# Patient Record
Sex: MALE | Race: WHITE | NOT HISPANIC OR LATINO | URBAN - METROPOLITAN AREA
[De-identification: names, ages, dates, MRNs, and addresses within clinical notes are randomized per-mention and may not be internally consistent; named-entity substitution may affect disease eponyms.]

---

## 2019-03-31 ENCOUNTER — EMERGENCY (EMERGENCY)
Facility: HOSPITAL | Age: 34
LOS: 1 days | Discharge: HOME | End: 2019-03-31
Attending: EMERGENCY MEDICINE | Admitting: EMERGENCY MEDICINE
Payer: SELF-PAY

## 2019-03-31 VITALS
RESPIRATION RATE: 18 BRPM | HEART RATE: 110 BPM | OXYGEN SATURATION: 96 % | DIASTOLIC BLOOD PRESSURE: 77 MMHG | SYSTOLIC BLOOD PRESSURE: 135 MMHG | TEMPERATURE: 98 F

## 2019-03-31 VITALS
SYSTOLIC BLOOD PRESSURE: 120 MMHG | DIASTOLIC BLOOD PRESSURE: 79 MMHG | TEMPERATURE: 98 F | RESPIRATION RATE: 24 BRPM | OXYGEN SATURATION: 100 % | HEART RATE: 119 BPM

## 2019-03-31 VITALS
DIASTOLIC BLOOD PRESSURE: 80 MMHG | OXYGEN SATURATION: 100 % | TEMPERATURE: 98 F | SYSTOLIC BLOOD PRESSURE: 123 MMHG | RESPIRATION RATE: 17 BRPM | HEART RATE: 98 BPM

## 2019-03-31 VITALS
OXYGEN SATURATION: 96 % | HEART RATE: 98 BPM | TEMPERATURE: 99 F | SYSTOLIC BLOOD PRESSURE: 149 MMHG | DIASTOLIC BLOOD PRESSURE: 81 MMHG | RESPIRATION RATE: 20 BRPM

## 2019-03-31 LAB
ANION GAP SERPL CALC-SCNC: 10 MMOL/L — SIGNIFICANT CHANGE UP (ref 9–16)
BUN SERPL-MCNC: 10 MG/DL — SIGNIFICANT CHANGE UP (ref 7–23)
CALCIUM SERPL-MCNC: 9.3 MG/DL — SIGNIFICANT CHANGE UP (ref 8.5–10.5)
CHLORIDE SERPL-SCNC: 103 MMOL/L — SIGNIFICANT CHANGE UP (ref 96–108)
CO2 SERPL-SCNC: 23 MMOL/L — SIGNIFICANT CHANGE UP (ref 22–31)
CREAT SERPL-MCNC: 0.85 MG/DL — SIGNIFICANT CHANGE UP (ref 0.5–1.3)
GLUCOSE SERPL-MCNC: 93 MG/DL — SIGNIFICANT CHANGE UP (ref 70–99)
HCT VFR BLD CALC: 39.3 % — SIGNIFICANT CHANGE UP (ref 39–50)
HGB BLD-MCNC: 12.1 G/DL — LOW (ref 13–17)
MCHC RBC-ENTMCNC: 19.1 PG — LOW (ref 27–34)
MCHC RBC-ENTMCNC: 30.8 G/DL — LOW (ref 32–36)
MCV RBC AUTO: 61.9 FL — LOW (ref 80–100)
PLATELET # BLD AUTO: 308 K/UL — SIGNIFICANT CHANGE UP (ref 150–400)
POTASSIUM SERPL-MCNC: 3.6 MMOL/L — SIGNIFICANT CHANGE UP (ref 3.5–5.3)
POTASSIUM SERPL-SCNC: 3.6 MMOL/L — SIGNIFICANT CHANGE UP (ref 3.5–5.3)
RBC # BLD: 6.35 M/UL — HIGH (ref 4.2–5.8)
RBC # FLD: 16.7 % — HIGH (ref 10.3–14.5)
SODIUM SERPL-SCNC: 136 MMOL/L — SIGNIFICANT CHANGE UP (ref 132–145)
WBC # BLD: 10.5 K/UL — SIGNIFICANT CHANGE UP (ref 3.8–10.5)
WBC # FLD AUTO: 10.5 K/UL — SIGNIFICANT CHANGE UP (ref 3.8–10.5)

## 2019-03-31 PROCEDURE — 93010 ELECTROCARDIOGRAM REPORT: CPT

## 2019-03-31 PROCEDURE — 99291 CRITICAL CARE FIRST HOUR: CPT

## 2019-03-31 PROCEDURE — 99285 EMERGENCY DEPT VISIT HI MDM: CPT | Mod: 25

## 2019-03-31 PROCEDURE — 99284 EMERGENCY DEPT VISIT MOD MDM: CPT

## 2019-03-31 RX ORDER — OXYCODONE AND ACETAMINOPHEN 5; 325 MG/1; MG/1
1 TABLET ORAL ONCE
Qty: 0 | Refills: 0 | Status: DISCONTINUED | OUTPATIENT
Start: 2019-03-31 | End: 2019-03-31

## 2019-03-31 RX ADMIN — OXYCODONE AND ACETAMINOPHEN 1 TABLET(S): 5; 325 TABLET ORAL at 09:50

## 2019-03-31 RX ADMIN — OXYCODONE AND ACETAMINOPHEN 1 TABLET(S): 5; 325 TABLET ORAL at 10:25

## 2019-03-31 NOTE — ED PROVIDER NOTE - OBJECTIVE STATEMENT
Healthy 32 y/o presents with persistent painful penile erection since 5:00 this morning after pt injected Trimix. Denies drug use, other pain, or discharge. Second time using Trimix and 1st time with this reaction.

## 2019-03-31 NOTE — ED PROVIDER NOTE - NSFOLLOWUPINSTRUCTIONS_ED_ALL_ED_FT
Priapism  Priapism is an unwanted erection of the penis that usually develops without sexual stimulation or desire. Priapism affects males of all ages. There are three types of priapism:    Recurrent acute priapism. With this type, erections are painful and last less than 3 hours. The erections come and go.  Acute prolonged priapism. With this type, erections are painful and last hours to days. This type can lead to erectile dysfunction.  Persistent priapism. With this type, erections are usually painless and can last weeks to years. The penis gets erect but not rigid. This type can lead to erectile dysfunction.    What are the causes?  This condition develops either when blood has difficulty leaving the penis (low-flow priapism) or if too much blood flows into the penis (high-flow priapism). Blood flow issues may be caused by:    Erectile dysfunction medicine. This is the most common cause.  Medicine that is used to treat depression and anxiety.  Blood problems that are common in people who have sickle cell disease or leukemia.  Use of illegal drugs, such as cocaine and marijuana.  Excessive alcohol use.  Neurological problems, such as multiple sclerosis.  Diabetes mellitus.  An injury to the penis.  An infection.    In some cases, the cause may not be known.    What are the signs or symptoms?  Symptoms of this condition include:    A prolonged erection.  A painful erection.    How is this diagnosed?  This condition is diagnosed with a physical exam. Blood tests may be done to help identify the cause of the condition.    How is this treated?  Treatment for this condition depends on the cause and the type of priapism. Recurrent acute priapism is often managed at home. Acute prolonged priapism is usually treated at a hospital, where treatment may involve:    Getting fluid and medicines for pain through an IV tube.  A blood transfusion.  A procedure to drain blood from the penis.  Surgery to make a passageway for blood to flow in the penis (surgical shunting).    No standard treatment exists for persistent priapism.    Follow these instructions at home:  Managing Recurrent Priapism     Try taking a warm bath or exercising.  Keep track of how long your erection lasts. If it does not go away in 3 hours, seek medical care.  General instructions     Avoid sexual stimulation and intercourse until your health care provider says that they are okay for you.  Avoid drugs or alcohol if they caused the priapism. Avoiding them can help to prevent the condition from coming back.  Drink enough fluid to keep your urine clear or pale yellow.  Empty your bladder as much as possible.  Take over-the-counter and prescription medicines only as told by your health care provider.  Do not take any medicines during an episode unless you get approval from your health care provider.  Keep all follow-up visits as told by your health care provider. This is important.  Contact a health care provider if:  Your pain gets worse.  Your pain does not improve with treatment.  You have recurrent priapism and your erection does not go away in 3 hours.  Get help right away if:  You have a fever or chills.  You have pain, swelling, or redness in your genitals or your groin area. Priapism - STOP USING TRIMEX, YOU COULD LOSE YOUR PENIS    Priapism is an unwanted erection of the penis that usually develops without sexual stimulation or desire. Priapism affects males of all ages. There are three types of priapism:    Recurrent acute priapism. With this type, erections are painful and last less than 3 hours. The erections come and go.  Acute prolonged priapism. With this type, erections are painful and last hours to days. This type can lead to erectile dysfunction.  Persistent priapism. With this type, erections are usually painless and can last weeks to years. The penis gets erect but not rigid. This type can lead to erectile dysfunction.    What are the causes?  This condition develops either when blood has difficulty leaving the penis (low-flow priapism) or if too much blood flows into the penis (high-flow priapism). Blood flow issues may be caused by:    Erectile dysfunction medicine. This is the most common cause.  Medicine that is used to treat depression and anxiety.  Blood problems that are common in people who have sickle cell disease or leukemia.  Use of illegal drugs, such as cocaine and marijuana.  Excessive alcohol use.  Neurological problems, such as multiple sclerosis.  Diabetes mellitus.  An injury to the penis.  An infection.    In some cases, the cause may not be known.    What are the signs or symptoms?  Symptoms of this condition include:    A prolonged erection.  A painful erection.    How is this diagnosed?  This condition is diagnosed with a physical exam. Blood tests may be done to help identify the cause of the condition.    How is this treated?  Treatment for this condition depends on the cause and the type of priapism. Recurrent acute priapism is often managed at home. Acute prolonged priapism is usually treated at a hospital, where treatment may involve:    Getting fluid and medicines for pain through an IV tube.  A blood transfusion.  A procedure to drain blood from the penis.  Surgery to make a passageway for blood to flow in the penis (surgical shunting).    No standard treatment exists for persistent priapism.    Follow these instructions at home:  Managing Recurrent Priapism     Try taking a warm bath or exercising.  Keep track of how long your erection lasts. If it does not go away in 3 hours, seek medical care.  General instructions     Avoid sexual stimulation and intercourse until your health care provider says that they are okay for you.  Avoid drugs or alcohol if they caused the priapism. Avoiding them can help to prevent the condition from coming back.  Drink enough fluid to keep your urine clear or pale yellow.  Empty your bladder as much as possible.  Take over-the-counter and prescription medicines only as told by your health care provider.  Do not take any medicines during an episode unless you get approval from your health care provider.  Keep all follow-up visits as told by your health care provider. This is important.  Contact a health care provider if:  Your pain gets worse.  Your pain does not improve with treatment.  You have recurrent priapism and your erection does not go away in 3 hours.  Get help right away if:  You have a fever or chills.  You have pain, swelling, or redness in your genitals or your groin area.

## 2019-03-31 NOTE — ED ADULT NURSE NOTE - CHPI ED NUR SYMPTOMS NEG
no weakness/no vomiting/no decreased eating/drinking/no chills/no fever/no nausea/no dizziness/no tingling

## 2019-03-31 NOTE — ED PROVIDER NOTE - CLINICAL SUMMARY MEDICAL DECISION MAKING FREE TEXT BOX
here as transfer emergently from [facility] for priapism. gu consulted immediately upon arrival, pt placed on cardiac monitor. pharmacist involved with phenylephrine dosing

## 2019-03-31 NOTE — ED PROVIDER NOTE - OBJECTIVE STATEMENT
33M presented to [facility] with priapism, he was transferred to [facility] ED. He reports he has had the erection for 6 hours. He injected Trimex, he does not recall the dosage. At baseline, he reports mild ED. sent here for  evaluation, and  consulted immediately upon arrival to the ED, pharmacist pre notified for need for phenylephrine and pt placed on monitor. denies ever having had this before. did not try anything prior to ED eval .

## 2019-03-31 NOTE — CONSULT NOTE ADULT - ATTENDING COMMENTS
Pt utilized Trimix on a recreational basis. Advised on the risks of use without medical necessity, with fibrosis, infection, progression of ED and priapism.

## 2019-03-31 NOTE — ED ADULT NURSE NOTE - OBJECTIVE STATEMENT
32 y/o male who injected medication with now priapism x 4.5 hrs PTA- comfort measures given, ice packs and oral meds as ordered by MD Guzman

## 2019-03-31 NOTE — ED PROVIDER NOTE - CRITICAL CARE PROVIDED
additional history taking/documentation/direct patient care (not related to procedure)/consultation with other physicians

## 2019-03-31 NOTE — ED PROVIDER NOTE - PHYSICAL EXAMINATION
CONSTITUTIONAL: Well-appearing; well-nourished; in no apparent distress.   HEAD: Normocephalic; atraumatic.   EYES:  conjunctiva and sclera clear  ENT: normal nose; no rhinorrhea;   NECK: Supple; non-tender;   RESPIRATORY: Breathing easily; no resp distress  GI: Soft; non-distended; non-tender; no palpable organomegaly.   EXT: No cyanosis or edema; N/V intact  SKIN: Normal for age and race; warm; dry; good turgor; no apparent lesions or rash.   NEURO: A & O x 3; face symmetric; grossly unremarkable.   PSYCHOLOGICAL: The patient’s mood and manner are appropriate.   : deferred to  team

## 2019-03-31 NOTE — CONSULT NOTE ADULT - SUBJECTIVE AND OBJECTIVE BOX
Patient is a 33y old  Male who presents with a chief complaint of     HPI: 33M presented to [facility]  with priapism, he was transferred to [facility] ED. He reports he has had the erection for 6 hours. He injected Trimex, he      Vital Signs Last 24 Hrs  T(C): 36.7 (31 Mar 2019 11:06), Max: 36.7 (31 Mar 2019 09:22)  T(F): 98.1 (31 Mar 2019 11:06), Max: 98.1 (31 Mar 2019 11:06)  HR: 119 (31 Mar 2019 11:06) (98 - 119)  BP: 120/79 (31 Mar 2019 11:06) (120/79 - 135/77)  BP(mean): --  RR: 24 (31 Mar 2019 11:06) (17 - 24)  SpO2: 100% (31 Mar 2019 11:06) (96% - 100%)  I&O's Summary      PE:  Gen:  Abd:  :  OCTAVIO:    LABS:                        12.1   10.5  )-----------( 308      ( 31 Mar 2019 10:24 )             39.3     03-31    136  |  103  |  10  ----------------------------<  93  3.6   |  23  |  0.85    Ca    9.3      31 Mar 2019 10:24        Cultures      A/P HPI: 33M presented to [facility] with priapism, he was transferred to [facility] ED. He reports he has had the erection for 6 hours. He injected Trimex, he does not recall the dosage. At baseline, he reports mild ED. He has morning erections. He has never seen an urologist for this before. He otherwise is healthy and has no prior urologic conditions       Vital Signs Last 24 Hrs  T(C): 36.7 (31 Mar 2019 11:06), Max: 36.7 (31 Mar 2019 09:22)  T(F): 98.1 (31 Mar 2019 11:06), Max: 98.1 (31 Mar 2019 11:06)  HR: 119 (31 Mar 2019 11:06) (98 - 119)  BP: 120/79 (31 Mar 2019 11:06) (120/79 - 135/77)  BP(mean): --  RR: 24 (31 Mar 2019 11:06) (17 - 24)  SpO2: 100% (31 Mar 2019 11:06) (96% - 100%)  I&O's Summary      PE:  Gen: NAD  Abd: soft, nt/nd  : circumcised, semi rigid erection     LABS:                        12.1   10.5  )-----------( 308      ( 31 Mar 2019 10:24 )             39.3     03-31    136  |  103  |  10  ----------------------------<  93  3.6   |  23  |  0.85    Ca    9.3      31 Mar 2019 10:24        Cultures      A/P 33M with priapism x 6 hours, on exam penis was semi rigid erection     -We discussed Trimex and the risks of using it for recreational use   -He was monitored in a room for an hour to confirm resolution of erection  -He will follow up in Clinic for further manage of ED   -Case seen and discussed with resident

## 2019-03-31 NOTE — ED PROVIDER NOTE - CLINICAL SUMMARY MEDICAL DECISION MAKING FREE TEXT BOX
34 y/o Male with medication induced priapism. Plan is to transfer to [facility] to have urologic consultation and injection with phenylephrine.

## 2019-04-01 PROBLEM — Z78.9 OTHER SPECIFIED HEALTH STATUS: Chronic | Status: INACTIVE | Noted: 2019-03-31 | Resolved: 2019-03-31

## 2019-04-03 DIAGNOSIS — N48.89 OTHER SPECIFIED DISORDERS OF PENIS: ICD-10-CM

## 2019-04-03 DIAGNOSIS — N48.33 PRIAPISM, DRUG-INDUCED: ICD-10-CM

## 2019-04-04 DIAGNOSIS — N48.30 PRIAPISM, UNSPECIFIED: ICD-10-CM

## 2023-12-27 NOTE — ED ADULT TRIAGE NOTE - MEANS OF ARRIVAL
ambulatory Pt BIBA from [facility] with difficulty breathing and fever. PT getting nebs x 4 hours without relief.

## 2025-01-07 NOTE — ED ADULT NURSE NOTE - OBJECTIVE STATEMENT
Writer spoke with patient about her Xarelto denial. Patient states that for 30 days its cost $229.00.  she states that after speak with [employer] they informed her to writer an appeal letter to get the tier lowered. At this time she will hold off on starting Warfarin until she knows what the appeal out come is.   
Pt transferred from [facility] for painful erection since 5 am to be seen by Urology.